# Patient Record
Sex: MALE | Race: WHITE
[De-identification: names, ages, dates, MRNs, and addresses within clinical notes are randomized per-mention and may not be internally consistent; named-entity substitution may affect disease eponyms.]

---

## 2017-02-02 ENCOUNTER — HOSPITAL ENCOUNTER (OUTPATIENT)
Dept: HOSPITAL 45 - C.RAD1850 | Age: 57
Discharge: HOME | End: 2017-02-02
Attending: NURSE PRACTITIONER
Payer: COMMERCIAL

## 2017-02-02 DIAGNOSIS — J44.9: ICD-10-CM

## 2017-02-02 DIAGNOSIS — J06.9: Primary | ICD-10-CM

## 2017-02-02 NOTE — DIAGNOSTIC IMAGING REPORT
CHEST 2 VIEWS ROUTINE



CLINICAL HISTORY: COPD. Upper respiratory infection. Cough.    



COMPARISON STUDY:  12/29/2015



FINDINGS: The heart is borderline enlarged. There is no failure. There is no

focal pulmonary consolidation. No pleural effusions are visualized.[ 



IMPRESSION: No active disease in the chest.







Electronically signed by:  Jarde Solis M.D.

2/2/2017 1:35 PM



Dictated Date/Time:  2/2/2017 1:35 PM

## 2017-03-17 ENCOUNTER — HOSPITAL ENCOUNTER (OUTPATIENT)
Dept: HOSPITAL 45 - C.LAB1850 | Age: 57
Discharge: HOME | End: 2017-03-17
Attending: INTERNAL MEDICINE
Payer: COMMERCIAL

## 2017-03-17 DIAGNOSIS — R97.20: ICD-10-CM

## 2017-03-17 DIAGNOSIS — E11.9: Primary | ICD-10-CM

## 2017-03-17 DIAGNOSIS — K76.0: ICD-10-CM

## 2017-03-17 LAB
ALBUMIN/GLOB SERPL: 0.9 {RATIO} (ref 0.9–2)
ALP SERPL-CCNC: 112 U/L (ref 45–117)
ALT SERPL-CCNC: 67 U/L (ref 12–78)
ANION GAP SERPL CALC-SCNC: 9 MMOL/L (ref 3–11)
AST SERPL-CCNC: 26 U/L (ref 15–37)
BASOPHILS # BLD: 0.02 K/UL (ref 0–0.2)
BASOPHILS NFR BLD: 0.2 %
BUN SERPL-MCNC: 16 MG/DL (ref 7–18)
BUN/CREAT SERPL: 14.7 (ref 10–20)
CALCIUM SERPL-MCNC: 8.7 MG/DL (ref 8.5–10.1)
CHLORIDE SERPL-SCNC: 98 MMOL/L (ref 98–107)
CHOLEST/HDLC SERPL: 3 {RATIO}
CO2 SERPL-SCNC: 30 MMOL/L (ref 21–32)
COMPLETE: YES
CREAT SERPL-MCNC: 1.1 MG/DL (ref 0.6–1.4)
CREAT UR-MCNC: 54 MG/DL
EOSINOPHIL NFR BLD AUTO: 201 K/UL (ref 130–400)
EST. AVERAGE GLUCOSE BLD GHB EST-MCNC: 206 MG/DL
GLOBULIN SER-MCNC: 4.2 GM/DL (ref 2.5–4)
GLUCOSE SERPL-MCNC: 275 MG/DL (ref 70–99)
GLUCOSE UR QL: 40 MG/DL
HCT VFR BLD CALC: 47.2 % (ref 42–52)
IG%: 0.2 %
IMM GRANULOCYTES NFR BLD AUTO: 30.2 %
KETONES UR QL STRIP: 52 MG/DL
LYMPHOCYTES # BLD: 3.02 K/UL (ref 1.2–3.4)
MCH RBC QN AUTO: 33 PG (ref 25–34)
MCHC RBC AUTO-ENTMCNC: 36.4 G/DL (ref 32–36)
MCV RBC AUTO: 90.6 FL (ref 80–100)
MONOCYTES NFR BLD: 7 %
NEUTROPHILS # BLD AUTO: 1.4 %
NEUTROPHILS NFR BLD AUTO: 61 %
NITRITE UR QL STRIP: 128 MG/DL (ref 0–150)
PH UR: 118 MG/DL (ref 0–200)
PMV BLD AUTO: 10 FL (ref 7.4–10.4)
POTASSIUM SERPL-SCNC: 3.6 MMOL/L (ref 3.5–5.1)
PSA SERPL-MCNC: 4.02 NG/ML (ref 0–4)
RATIO: 58.9 MCG/MG (ref 0–30)
RBC # BLD AUTO: 5.21 M/UL (ref 4.7–6.1)
SODIUM SERPL-SCNC: 137 MMOL/L (ref 136–145)
VERY LOW DENSITY LIPOPROT CALC: 26 MG/DL
WBC # BLD AUTO: 9.99 K/UL (ref 4.8–10.8)

## 2017-04-24 ENCOUNTER — HOSPITAL ENCOUNTER (OUTPATIENT)
Dept: HOSPITAL 45 - C.ULTR | Age: 57
Discharge: HOME | End: 2017-04-24
Attending: SURGERY
Payer: COMMERCIAL

## 2017-04-24 DIAGNOSIS — I65.23: Primary | ICD-10-CM

## 2017-04-24 NOTE — DIAGNOSTIC IMAGING REPORT
ULTRASOUND OF THE CAROTID ARTERIES



CLINICAL HISTORY: Carotid artery stenosis. Follow-up examination.    



COMPARISON STUDY: 4/20/2016



TECHNIQUE: Real-time, grayscale, and color Doppler sonography of the carotid

arteries was performed. Imaging reviewed in the transverse and longitudinal

planes. NASCET criteria was utilized for stenosis calcification.



FINDINGS:



There is mild atherosclerotic plaque present bilaterally. 

The peak systolic velocity within the right internal carotid artery is 126

cm/sec.

The systolic velocity ratio of right internal to common carotid artery is 1.8.

The peak systolic velocity within the left internal carotid artery is 78 cm/sec.

The systolic velocity ratio left internal to common carotid artery is 1.



Antegrade flow is seen in the vertebral arteries. The external carotid arteries

are patent.



Blood pressure in the right arm measured 154 mm/Hg.  Blood pressure in the left

arm measured 177 mm/Hg.



IMPRESSION: Atheromatous changes with less than 50% stenosis of the internal

carotid arteries bilaterally







Electronically signed by:  Jared Solis M.D.

4/24/2017 10:15 AM



Dictated Date/Time:  4/24/2017 10:12 AM

## 2017-05-19 ENCOUNTER — HOSPITAL ENCOUNTER (OUTPATIENT)
Dept: HOSPITAL 45 - C.NEUR | Age: 57
Discharge: HOME | End: 2017-05-19
Attending: PHYSICIAN ASSISTANT
Payer: COMMERCIAL

## 2017-05-19 VITALS
HEIGHT: 74.02 IN | WEIGHT: 240.52 LBS | HEIGHT: 74.02 IN | BODY MASS INDEX: 30.87 KG/M2 | BODY MASS INDEX: 30.87 KG/M2 | WEIGHT: 240.52 LBS

## 2017-05-19 VITALS — SYSTOLIC BLOOD PRESSURE: 159 MMHG | HEART RATE: 96 BPM | DIASTOLIC BLOOD PRESSURE: 87 MMHG

## 2017-05-19 DIAGNOSIS — G47.30: Primary | ICD-10-CM

## 2017-05-23 ENCOUNTER — HOSPITAL ENCOUNTER (OUTPATIENT)
Dept: HOSPITAL 45 - C.RAD1850 | Age: 57
Discharge: HOME | End: 2017-05-23
Attending: INTERNAL MEDICINE
Payer: COMMERCIAL

## 2017-05-23 DIAGNOSIS — M25.531: Primary | ICD-10-CM

## 2017-05-23 NOTE — DIAGNOSTIC IMAGING REPORT
RIGHT WRIST MIN 3 VIEWS ROUTINE



CLINICAL HISTORY: M25.531 Wrist pain, acute, [id number]

Right pain



COMPARISON: None.



DISCUSSION: The bones and joint spaces appear intact. There is no evidence of

fracture, dislocation or bony disease. There is no evidence for soft tissue

swelling.



IMPRESSION: Negative study.







Electronically signed by:  Isiah Marcos M.D.

5/23/2017 9:10 AM



Dictated Date/Time:  5/23/2017 9:08 AM

## 2017-07-03 ENCOUNTER — HOSPITAL ENCOUNTER (OUTPATIENT)
Dept: HOSPITAL 45 - C.LAB1850 | Age: 57
Discharge: HOME | End: 2017-07-03
Attending: INTERNAL MEDICINE
Payer: COMMERCIAL

## 2017-07-03 DIAGNOSIS — E11.65: Primary | ICD-10-CM

## 2017-07-03 DIAGNOSIS — I48.0: ICD-10-CM

## 2017-07-03 LAB
ANION GAP SERPL CALC-SCNC: 8 MMOL/L (ref 3–11)
BUN SERPL-MCNC: 14 MG/DL (ref 7–18)
BUN/CREAT SERPL: 14.9 (ref 10–20)
CALCIUM SERPL-MCNC: 8.6 MG/DL (ref 8.5–10.1)
CHLORIDE SERPL-SCNC: 102 MMOL/L (ref 98–107)
CO2 SERPL-SCNC: 31 MMOL/L (ref 21–32)
CREAT SERPL-MCNC: 0.91 MG/DL (ref 0.6–1.4)
EST. AVERAGE GLUCOSE BLD GHB EST-MCNC: 217 MG/DL
GLUCOSE SERPL-MCNC: 144 MG/DL (ref 70–99)
POTASSIUM SERPL-SCNC: 3 MMOL/L (ref 3.5–5.1)
SODIUM SERPL-SCNC: 141 MMOL/L (ref 136–145)

## 2017-07-12 ENCOUNTER — HOSPITAL ENCOUNTER (OUTPATIENT)
Dept: HOSPITAL 45 - C.LAB1850 | Age: 57
Discharge: HOME | End: 2017-07-12
Attending: INTERNAL MEDICINE
Payer: COMMERCIAL

## 2017-07-12 DIAGNOSIS — E87.6: Primary | ICD-10-CM

## 2017-07-12 LAB
ANION GAP SERPL CALC-SCNC: 8 MMOL/L (ref 3–11)
BUN SERPL-MCNC: 12 MG/DL (ref 7–18)
BUN/CREAT SERPL: 15.7 (ref 10–20)
CALCIUM SERPL-MCNC: 8.5 MG/DL (ref 8.5–10.1)
CHLORIDE SERPL-SCNC: 102 MMOL/L (ref 98–107)
CO2 SERPL-SCNC: 31 MMOL/L (ref 21–32)
CREAT SERPL-MCNC: 0.75 MG/DL (ref 0.6–1.4)
GLUCOSE SERPL-MCNC: 183 MG/DL (ref 70–99)
POTASSIUM SERPL-SCNC: 3.4 MMOL/L (ref 3.5–5.1)
SODIUM SERPL-SCNC: 141 MMOL/L (ref 136–145)

## 2017-09-14 ENCOUNTER — HOSPITAL ENCOUNTER (OUTPATIENT)
Dept: HOSPITAL 45 - C.LAB1850 | Age: 57
Discharge: HOME | End: 2017-09-14
Attending: DERMATOLOGY
Payer: COMMERCIAL

## 2017-09-14 DIAGNOSIS — L40.9: Primary | ICD-10-CM

## 2017-09-14 LAB
ALP SERPL-CCNC: 84 U/L (ref 45–117)
ALT SERPL-CCNC: 36 U/L (ref 12–78)
AST SERPL-CCNC: 26 U/L (ref 15–37)
EOSINOPHIL NFR BLD AUTO: 219 K/UL (ref 130–400)
HCT VFR BLD CALC: 39.6 % (ref 42–52)
MCH RBC QN AUTO: 32.4 PG (ref 25–34)
MCHC RBC AUTO-ENTMCNC: 36.1 G/DL (ref 32–36)
MCV RBC AUTO: 89.8 FL (ref 80–100)
PMV BLD AUTO: 10 FL (ref 7.4–10.4)
RBC # BLD AUTO: 4.41 M/UL (ref 4.7–6.1)
WBC # BLD AUTO: 9 K/UL (ref 4.8–10.8)

## 2017-09-29 NOTE — CODING QUERY NO DIAGNOSIS
TREATMENT RENDERED WITHOUT A DIAGNOSIS                                                  



To promote full compliance with coding requirements relating to patient care, physician 
participation is requested in all cases of  uncertainty.  Please assist us with 
providing a diagnosis/symptom for the test(s) below:



A diagnosis/symptom was not documented on your Order.  A valid diagnosis/symptom is 
required to bill all insurances.



**Please remember that we are unable to code a diagnosis of rule out, probable, possible, 
questionable, or suspected.  



Tests that require a diagnosis:





* CBC W/O DIFF                          DIAGNOSIS:







* LIVER PROFILE                          DIAGNOSIS:







Provider Signature: _____________________________ Date: _________



Thank you  

Iman Bell

[employer] [facility]

Phone:  [phone]

Fax:  [phone]



Once completed, please kindly fax back to [phone]



For questions please call [phone]

## 2018-02-05 ENCOUNTER — HOSPITAL ENCOUNTER (OUTPATIENT)
Dept: HOSPITAL 45 - C.LAB1850 | Age: 58
Discharge: HOME | End: 2018-02-05
Attending: PHYSICIAN ASSISTANT
Payer: COMMERCIAL

## 2018-02-05 DIAGNOSIS — E78.5: ICD-10-CM

## 2018-02-05 DIAGNOSIS — I42.9: Primary | ICD-10-CM

## 2018-02-05 LAB
ALT SERPL-CCNC: 30 U/L (ref 12–78)
AST SERPL-CCNC: 15 U/L (ref 15–37)
BUN SERPL-MCNC: 16 MG/DL (ref 7–18)
CALCIUM SERPL-MCNC: 8.9 MG/DL (ref 8.5–10.1)
CO2 SERPL-SCNC: 33 MMOL/L (ref 21–32)
CREAT SERPL-MCNC: 0.91 MG/DL (ref 0.6–1.4)
EOSINOPHIL NFR BLD AUTO: 197 K/UL (ref 130–400)
GLUCOSE SERPL-MCNC: 153 MG/DL (ref 70–99)
HCT VFR BLD CALC: 41.7 % (ref 42–52)
HGB BLD-MCNC: 14.9 G/DL (ref 14–18)
KETONES UR QL STRIP: 44 MG/DL
MCH RBC QN AUTO: 32.7 PG (ref 25–34)
MCHC RBC AUTO-ENTMCNC: 35.7 G/DL (ref 32–36)
MCV RBC AUTO: 91.4 FL (ref 80–100)
PH UR: 107 MG/DL (ref 0–200)
PMV BLD AUTO: 9.9 FL (ref 7.4–10.4)
POTASSIUM SERPL-SCNC: 3.5 MMOL/L (ref 3.5–5.1)
RED CELL DISTRIBUTION WIDTH CV: 13.2 % (ref 11.5–14.5)
RED CELL DISTRIBUTION WIDTH SD: 43 FL (ref 36.4–46.3)
SODIUM SERPL-SCNC: 139 MMOL/L (ref 136–145)
WBC # BLD AUTO: 10.8 K/UL (ref 4.8–10.8)

## 2018-08-13 ENCOUNTER — HOSPITAL ENCOUNTER (OUTPATIENT)
Dept: HOSPITAL 45 - C.LAB1850 | Age: 58
Discharge: HOME | End: 2018-08-13
Attending: INTERNAL MEDICINE
Payer: COMMERCIAL

## 2018-08-13 DIAGNOSIS — I48.0: Primary | ICD-10-CM

## 2018-08-13 DIAGNOSIS — E11.9: ICD-10-CM

## 2018-08-13 LAB
BUN SERPL-MCNC: 15 MG/DL (ref 7–18)
CALCIUM SERPL-MCNC: 9.3 MG/DL (ref 8.5–10.1)
CO2 SERPL-SCNC: 29 MMOL/L (ref 21–32)
CREAT SERPL-MCNC: 0.93 MG/DL (ref 0.6–1.4)
CREAT UR-MCNC: 59.3 MG/DL
GLUCOSE SERPL-MCNC: 170 MG/DL (ref 70–99)
HBA1C MFR BLD: 8.6 % (ref 4.5–5.6)
KETONES UR QL STRIP: 28 MG/DL
PH UR: 89 MG/DL (ref 0–200)
POTASSIUM SERPL-SCNC: 3.6 MMOL/L (ref 3.5–5.1)
SODIUM SERPL-SCNC: 137 MMOL/L (ref 136–145)